# Patient Record
Sex: FEMALE | Race: WHITE
[De-identification: names, ages, dates, MRNs, and addresses within clinical notes are randomized per-mention and may not be internally consistent; named-entity substitution may affect disease eponyms.]

---

## 2019-01-29 PROBLEM — Z00.00 ENCOUNTER FOR PREVENTIVE HEALTH EXAMINATION: Status: ACTIVE | Noted: 2019-01-29

## 2020-12-08 PROBLEM — K43.2 RECURRENT INCISIONAL HERNIA: Status: RESOLVED | Noted: 2020-12-08 | Resolved: 2020-12-08

## 2020-12-09 ENCOUNTER — APPOINTMENT (OUTPATIENT)
Dept: SURGERY | Facility: CLINIC | Age: 55
End: 2020-12-09
Payer: COMMERCIAL

## 2020-12-09 VITALS
DIASTOLIC BLOOD PRESSURE: 80 MMHG | WEIGHT: 205 LBS | HEART RATE: 96 BPM | BODY MASS INDEX: 38.71 KG/M2 | OXYGEN SATURATION: 98 % | SYSTOLIC BLOOD PRESSURE: 137 MMHG | TEMPERATURE: 97.5 F | HEIGHT: 61 IN | RESPIRATION RATE: 15 BRPM

## 2020-12-09 DIAGNOSIS — K40.20 BILATERAL INGUINAL HERNIA, W/OUT OBSTRUCTION OR GANGRENE, NOT SPECIFIED AS RECURRENT: ICD-10-CM

## 2020-12-09 DIAGNOSIS — K43.2 INCISIONAL HERNIA W/OUT OBSTRUCTION OR GANGRENE: ICD-10-CM

## 2020-12-09 DIAGNOSIS — M32.9 SYSTEMIC LUPUS ERYTHEMATOSUS, UNSPECIFIED: ICD-10-CM

## 2020-12-09 PROCEDURE — 99244 OFF/OP CNSLTJ NEW/EST MOD 40: CPT

## 2020-12-09 PROCEDURE — 99072 ADDL SUPL MATRL&STAF TM PHE: CPT

## 2020-12-09 RX ORDER — IRON/IRON ASP GLY/FA/MV-MIN 38 125-25-1MG
TABLET ORAL
Refills: 0 | Status: ACTIVE | COMMUNITY

## 2020-12-09 RX ORDER — ALENDRONATE SODIUM 70 MG/1
TABLET ORAL
Refills: 0 | Status: ACTIVE | COMMUNITY

## 2020-12-09 RX ORDER — HYDROXYCHLOROQUINE SULFATE 200 MG/1
200 TABLET ORAL
Refills: 0 | Status: ACTIVE | COMMUNITY

## 2020-12-09 RX ORDER — PNV NO.95/FERROUS FUM/FOLIC AC 28MG-0.8MG
TABLET ORAL
Refills: 0 | Status: ACTIVE | COMMUNITY

## 2020-12-09 RX ORDER — CHROMIUM 200 MCG
TABLET ORAL
Refills: 0 | Status: ACTIVE | COMMUNITY

## 2020-12-09 NOTE — PLAN
[FreeTextEntry1] : CT images to be downloaded and reviewed and decision then to be made regarding possible need for hernia repair.

## 2020-12-09 NOTE — ASSESSMENT
[FreeTextEntry1] : 55-year-old obese female with resolved episode of abdominal pain, diarrhea and hematuria with CT scan reportedly showing fat containing inguinal hernias and a fat-containing upper midline defect. Patient's body habitus precludes any meaningful exam and CT images unable to be retrieved for review.

## 2020-12-09 NOTE — HISTORY OF PRESENT ILLNESS
[de-identified] : Linda is a 56 y/o female here for evaluation of pain at surgical site. Patient is s/p repair of incarcerated recurrent incisional hernia with Kugel patch on 12/1/12.  [de-identified] : Status post repair of incarcerated recurrent incisional hernia 8 years ago. Several weeks ago patient experienced acute midabdominal pain as well as pain across the suprapubic area associated with loose bowel movements and hematuria. The hematuria cleared fairly quickly and exam by the patient's PCP was apparently unremarkable. She was then sent for an abdominopelvic CT scan which reportedly demonstrated bilateral fat containing inguinal hernias as well as a fat-containing midline defect. At this point the patient's pain has largely resolved and bowel movements have returned to normal. No further episodes of hematuria noted.

## 2020-12-09 NOTE — PHYSICAL EXAM
[Normal Breath Sounds] : Normal breath sounds [Normal Heart Sounds] : normal heart sounds [Normal Rate and Rhythm] : normal rate and rhythm [No Rash or Lesion] : No rash or lesion [Alert] : alert [Oriented to Person] : oriented to person [Oriented to Place] : oriented to place [Oriented to Time] : oriented to time [Calm] : calm [JVD] : no jugular venous distention  [de-identified] : Well nourished female, in no apparent distress [de-identified] : WNL [de-identified] : Morbidly obese, soft, nondistended, nontender. Well-healed lower midline surgical scar and no hernias palpable in either groin or anywhere in the midline. [de-identified] : Full ROM

## 2024-12-13 ENCOUNTER — TRANSCRIPTION ENCOUNTER (OUTPATIENT)
Age: 59
End: 2024-12-13

## 2024-12-13 ENCOUNTER — OUTPATIENT (OUTPATIENT)
Dept: OUTPATIENT SERVICES | Facility: HOSPITAL | Age: 59
LOS: 1 days | End: 2024-12-13
Payer: COMMERCIAL

## 2024-12-13 VITALS
OXYGEN SATURATION: 96 % | RESPIRATION RATE: 18 BRPM | DIASTOLIC BLOOD PRESSURE: 82 MMHG | TEMPERATURE: 99 F | HEART RATE: 87 BPM | HEIGHT: 60 IN | WEIGHT: 182.1 LBS | SYSTOLIC BLOOD PRESSURE: 142 MMHG

## 2024-12-13 DIAGNOSIS — Z47.2 ENCOUNTER FOR REMOVAL OF INTERNAL FIXATION DEVICE: ICD-10-CM

## 2024-12-13 DIAGNOSIS — M20.12 HALLUX VALGUS (ACQUIRED), LEFT FOOT: ICD-10-CM

## 2024-12-13 DIAGNOSIS — M20.5X2 OTHER DEFORMITIES OF TOE(S) (ACQUIRED), LEFT FOOT: ICD-10-CM

## 2024-12-13 DIAGNOSIS — Z98.890 OTHER SPECIFIED POSTPROCEDURAL STATES: Chronic | ICD-10-CM

## 2024-12-13 DIAGNOSIS — Z98.51 TUBAL LIGATION STATUS: Chronic | ICD-10-CM

## 2024-12-13 DIAGNOSIS — Z01.818 ENCOUNTER FOR OTHER PREPROCEDURAL EXAMINATION: ICD-10-CM

## 2024-12-13 DIAGNOSIS — M24.575 CONTRACTURE, LEFT FOOT: ICD-10-CM

## 2024-12-13 LAB
ANION GAP SERPL CALC-SCNC: 12 MMOL/L — SIGNIFICANT CHANGE UP (ref 5–17)
BUN SERPL-MCNC: 17 MG/DL — SIGNIFICANT CHANGE UP (ref 7–23)
CALCIUM SERPL-MCNC: 9 MG/DL — SIGNIFICANT CHANGE UP (ref 8.4–10.5)
CHLORIDE SERPL-SCNC: 107 MMOL/L — SIGNIFICANT CHANGE UP (ref 96–108)
CO2 SERPL-SCNC: 23 MMOL/L — SIGNIFICANT CHANGE UP (ref 22–31)
CREAT SERPL-MCNC: 0.39 MG/DL — LOW (ref 0.5–1.3)
EGFR: 114 ML/MIN/1.73M2 — SIGNIFICANT CHANGE UP
GLUCOSE SERPL-MCNC: 88 MG/DL — SIGNIFICANT CHANGE UP (ref 70–99)
HCT VFR BLD CALC: 39.2 % — SIGNIFICANT CHANGE UP (ref 34.5–45)
HGB BLD-MCNC: 13.2 G/DL — SIGNIFICANT CHANGE UP (ref 11.5–15.5)
MCHC RBC-ENTMCNC: 30.8 PG — SIGNIFICANT CHANGE UP (ref 27–34)
MCHC RBC-ENTMCNC: 33.7 G/DL — SIGNIFICANT CHANGE UP (ref 32–36)
MCV RBC AUTO: 91.4 FL — SIGNIFICANT CHANGE UP (ref 80–100)
NRBC # BLD: 0 /100 WBCS — SIGNIFICANT CHANGE UP (ref 0–0)
PLATELET # BLD AUTO: 211 K/UL — SIGNIFICANT CHANGE UP (ref 150–400)
POTASSIUM SERPL-MCNC: 3.7 MMOL/L — SIGNIFICANT CHANGE UP (ref 3.5–5.3)
POTASSIUM SERPL-SCNC: 3.7 MMOL/L — SIGNIFICANT CHANGE UP (ref 3.5–5.3)
RBC # BLD: 4.29 M/UL — SIGNIFICANT CHANGE UP (ref 3.8–5.2)
RBC # FLD: 13.6 % — SIGNIFICANT CHANGE UP (ref 10.3–14.5)
SODIUM SERPL-SCNC: 142 MMOL/L — SIGNIFICANT CHANGE UP (ref 135–145)
WBC # BLD: 6.59 K/UL — SIGNIFICANT CHANGE UP (ref 3.8–10.5)
WBC # FLD AUTO: 6.59 K/UL — SIGNIFICANT CHANGE UP (ref 3.8–10.5)

## 2024-12-13 PROCEDURE — 93010 ELECTROCARDIOGRAM REPORT: CPT | Mod: NC

## 2024-12-13 NOTE — H&P PST ADULT - PROBLEM SELECTOR PLAN 1
Patient provided with pre-operative instructions and verbalized understanding.  Patient can take ________ but otherwise will be NPO on day of surgery. Patient will stop NSAIDs, aspirin, herbal supplements or vitamins 1 week prior to surgery.  Chlorhexidine wash provided with instructions, verbalized understanding.  Pending medical clearance as per surgeon. Patient provided with pre-operative instructions and verbalized understanding.  Patient will be NPO on day of surgery. Patient will stop NSAIDs, aspirin, herbal supplements or vitamins 1 week prior to surgery.  Chlorhexidine wash provided with instructions, verbalized understanding.      Pending medical clearance as per surgeon.  instructed no toe nail polish   last dose wegovy on 12/1, instructed to hold off until after surgery

## 2024-12-13 NOTE — H&P PST ADULT - NSICDXPASTMEDICALHX_GEN_ALL_CORE_FT
PAST MEDICAL HISTORY:  Hallux valgus of left foot     Hypertension     Prediabetes     SLE (Systemic Lupus Erythematosus)

## 2024-12-13 NOTE — H&P PST ADULT - HISTORY OF PRESENT ILLNESS
Patient is a 59 year old F with PMHx of __ or no pertinent medical history presents for perioperative testing for _____ with Dr. Mireille Carter scheduled for /2024. Reports left foot pain since September, seen by podiatrist, "the insert was moved from my old bunion surgery" therefore recommended for upcoming surgery. Denies any acute symptoms at this time. Patient otherwise feels well overall.      Patient is a 59 year old F presents for perioperative testing for removal of hardware left foot, vallecillo left foot an tenotomy with Dr. Mireille Carter scheduled for 12/20/2024. Reports left foot pain since September, seen by podiatrist, "the insert was moved from my old bunion surgery" therefore recommended for upcoming surgery. Denies any acute symptoms at this time. Patient otherwise feels well overall.

## 2024-12-13 NOTE — H&P PST ADULT - NSICDXFAMILYHX_GEN_ALL_CORE_FT
FAMILY HISTORY:  Sibling  Still living? Unknown  Family history of diabetes mellitus (DM), Age at diagnosis: Age Unknown    Aunt  Still living? Unknown  Family history of diabetes mellitus (DM), Age at diagnosis: Age Unknown

## 2024-12-13 NOTE — H&P PST ADULT - NSICDXPASTSURGICALHX_GEN_ALL_CORE_FT
PAST SURGICAL HISTORY:  History of Appendectomy     S/P Repair of Recurrent Ventral Hernia      PAST SURGICAL HISTORY:  H/O tubal ligation     History of Appendectomy     History of bunionectomy of left great toe     History of hemorrhoidectomy     S/P Repair of Recurrent Ventral Hernia     Status post left rotator cuff repair

## 2024-12-13 NOTE — H&P PST ADULT - COMMENTS
161/88 electronic Denies personal h/o DVT, PE  Denies bleeding or clotting disorders  Denies dentures/partials, loose teeth/caps

## 2024-12-13 NOTE — H&P PST ADULT - NSANTHOSAYNRD_GEN_A_CORE
neck 15 inches/No. KEVIN screening performed.  STOP BANG Legend: 0-2 = LOW Risk; 3-4 = INTERMEDIATE Risk; 5-8 = HIGH Risk

## 2024-12-14 LAB
A1C WITH ESTIMATED AVERAGE GLUCOSE RESULT: 5.6 % — SIGNIFICANT CHANGE UP (ref 4–5.6)
ESTIMATED AVERAGE GLUCOSE: 114 MG/DL — SIGNIFICANT CHANGE UP (ref 68–114)

## 2024-12-17 PROCEDURE — 80048 BASIC METABOLIC PNL TOTAL CA: CPT

## 2024-12-17 PROCEDURE — 93005 ELECTROCARDIOGRAM TRACING: CPT

## 2024-12-17 PROCEDURE — 83036 HEMOGLOBIN GLYCOSYLATED A1C: CPT

## 2024-12-17 PROCEDURE — 85027 COMPLETE CBC AUTOMATED: CPT

## 2024-12-17 PROCEDURE — G0463: CPT

## 2024-12-17 PROCEDURE — 36415 COLL VENOUS BLD VENIPUNCTURE: CPT

## 2024-12-19 RX ORDER — ORAL SEMAGLUTIDE 7 MG/1
2.4 TABLET ORAL
Refills: 0 | DISCHARGE

## 2024-12-19 NOTE — ASU PATIENT PROFILE, ADULT - NSICDXPASTSURGICALHX_GEN_ALL_CORE_FT
PAST SURGICAL HISTORY:  H/O tubal ligation     History of Appendectomy     History of bunionectomy of left great toe metal    History of hemorrhoidectomy     S/P Repair of Recurrent Ventral Hernia mesh    Status post left rotator cuff repair

## 2024-12-20 ENCOUNTER — OUTPATIENT (OUTPATIENT)
Dept: OUTPATIENT SERVICES | Facility: HOSPITAL | Age: 59
LOS: 1 days | End: 2024-12-20
Payer: COMMERCIAL

## 2024-12-20 ENCOUNTER — RESULT REVIEW (OUTPATIENT)
Age: 59
End: 2024-12-20

## 2024-12-20 ENCOUNTER — TRANSCRIPTION ENCOUNTER (OUTPATIENT)
Age: 59
End: 2024-12-20

## 2024-12-20 VITALS
RESPIRATION RATE: 15 BRPM | TEMPERATURE: 98 F | SYSTOLIC BLOOD PRESSURE: 143 MMHG | WEIGHT: 182.1 LBS | HEIGHT: 60 IN | OXYGEN SATURATION: 97 % | HEART RATE: 87 BPM | DIASTOLIC BLOOD PRESSURE: 89 MMHG

## 2024-12-20 VITALS
TEMPERATURE: 97 F | RESPIRATION RATE: 18 BRPM | OXYGEN SATURATION: 99 % | HEART RATE: 78 BPM | DIASTOLIC BLOOD PRESSURE: 74 MMHG | SYSTOLIC BLOOD PRESSURE: 132 MMHG

## 2024-12-20 DIAGNOSIS — M20.12 HALLUX VALGUS (ACQUIRED), LEFT FOOT: ICD-10-CM

## 2024-12-20 DIAGNOSIS — Z98.890 OTHER SPECIFIED POSTPROCEDURAL STATES: Chronic | ICD-10-CM

## 2024-12-20 DIAGNOSIS — M20.5X2 OTHER DEFORMITIES OF TOE(S) (ACQUIRED), LEFT FOOT: ICD-10-CM

## 2024-12-20 DIAGNOSIS — Z47.2 ENCOUNTER FOR REMOVAL OF INTERNAL FIXATION DEVICE: ICD-10-CM

## 2024-12-20 DIAGNOSIS — M24.575 CONTRACTURE, LEFT FOOT: ICD-10-CM

## 2024-12-20 DIAGNOSIS — Z98.51 TUBAL LIGATION STATUS: Chronic | ICD-10-CM

## 2024-12-20 LAB — GLUCOSE BLDC GLUCOMTR-MCNC: 93 MG/DL — SIGNIFICANT CHANGE UP (ref 70–99)

## 2024-12-20 PROCEDURE — 28285 REPAIR OF HAMMERTOE: CPT | Mod: T1

## 2024-12-20 PROCEDURE — 73620 X-RAY EXAM OF FOOT: CPT | Mod: 26,LT

## 2024-12-20 PROCEDURE — 20680 REMOVAL OF IMPLANT DEEP: CPT

## 2024-12-20 PROCEDURE — 88300 SURGICAL PATH GROSS: CPT | Mod: 26

## 2024-12-20 PROCEDURE — 73620 X-RAY EXAM OF FOOT: CPT

## 2024-12-20 PROCEDURE — 82962 GLUCOSE BLOOD TEST: CPT

## 2024-12-20 PROCEDURE — 28292 COR HLX VLGS RSC PRX PHLX BS: CPT | Mod: TA

## 2024-12-20 PROCEDURE — 88300 SURGICAL PATH GROSS: CPT

## 2024-12-20 RX ORDER — 0.9 % SODIUM CHLORIDE 0.9 %
1000 INTRAVENOUS SOLUTION INTRAVENOUS
Refills: 0 | Status: DISCONTINUED | OUTPATIENT
Start: 2024-12-20 | End: 2024-12-20

## 2024-12-20 RX ORDER — FENTANYL 12 UG/H
25 PATCH, EXTENDED RELEASE TRANSDERMAL ONCE
Refills: 0 | Status: DISCONTINUED | OUTPATIENT
Start: 2024-12-20 | End: 2024-12-20

## 2024-12-20 RX ORDER — ONDANSETRON HYDROCHLORIDE 4 MG/1
4 TABLET, FILM COATED ORAL ONCE
Refills: 0 | Status: DISCONTINUED | OUTPATIENT
Start: 2024-12-20 | End: 2024-12-20

## 2024-12-20 RX ORDER — HYDROXYCHLOROQUINE SULFATE 200 MG/1
1 TABLET, FILM COATED ORAL
Refills: 0 | DISCHARGE

## 2024-12-20 RX ADMIN — FENTANYL 25 MICROGRAM(S): 12 PATCH, EXTENDED RELEASE TRANSDERMAL at 09:09

## 2024-12-20 NOTE — BRIEF OPERATIVE NOTE - NSICDXBRIEFPREOP_GEN_ALL_CORE_FT
PRE-OP DIAGNOSIS:  Painful orthopaedic hardware 20-Dec-2024 09:05:55  Mireille Carter of left foot 20-Dec-2024 09:06:03  Mireille Carter, left foot 20-Dec-2024 09:06:20  Mireille Carter

## 2024-12-20 NOTE — BRIEF OPERATIVE NOTE - OPERATION/FINDINGS
S/p left foot removal of painful hardware, vallecillo osteotomy, capsulotomy and tenotomies of digits 2-5

## 2024-12-20 NOTE — BRIEF OPERATIVE NOTE - NSICDXBRIEFPROCEDURE_GEN_ALL_CORE_FT
PROCEDURES:  Removal of hardware from extremity 20-Dec-2024 09:04:23  Mireille Carter  Gill arthroplasty 20-Dec-2024 09:04:41  Mireille Carter  Hammertoe repair 20-Dec-2024 09:05:43  Mireille Carter

## 2024-12-20 NOTE — ASU DISCHARGE PLAN (ADULT/PEDIATRIC) - FINANCIAL ASSISTANCE
[facility] provides services at a reduced cost to those who are determined to be eligible through [facility]’s financial assistance program. Information regarding [facility]’s financial assistance program can be found by going to [email or web address] or by calling [phone].

## 2024-12-20 NOTE — ASU DISCHARGE PLAN (ADULT/PEDIATRIC) - CARE PROVIDER_API CALL
Mireille Carter  Podiatric Medicine and Surgery  [address] [place] [address]  Phone: [phone]  Fax: [phone]  Follow Up Time:

## 2024-12-20 NOTE — BRIEF OPERATIVE NOTE - NSICDXBRIEFPOSTOP_GEN_ALL_CORE_FT
POST-OP DIAGNOSIS:  Painful orthopaedic hardware 20-Dec-2024 09:06:42  Mireille Carter of left foot 20-Dec-2024 09:06:52  Mireille Carter, left foot 20-Dec-2024 09:07:01  Mireille Carter

## 2024-12-24 LAB — SURGICAL PATHOLOGY STUDY: SIGNIFICANT CHANGE UP

## (undated) DEVICE — SOL IRR POUR H2O 1500ML

## (undated) DEVICE — SYR CONTROL LUER LOK 10CC

## (undated) DEVICE — DRSG COBAN 4"

## (undated) DEVICE — DRAPE C ARM C-ARMOUR

## (undated) DEVICE — PACK LIJ BASIC ORTHO

## (undated) DEVICE — SUT MONOCRYL 4-0 27" PS-2 UNDYED

## (undated) DEVICE — DRAPE C ARM UNIVERSAL

## (undated) DEVICE — TOURNIQUET ESMARK 6"

## (undated) DEVICE — WARMING BLANKET FULL PEDS

## (undated) DEVICE — POSITIONER STRAP ARMBOARD VELCRO TS-30

## (undated) DEVICE — TOURNIQUET ESMARK 4"

## (undated) DEVICE — SUT VICRYL PLUS 2-0 27" FS-1 UNDYED

## (undated) DEVICE — ELCTR BOVIE TIP BLADE INSULATED 2.75" EDGE

## (undated) DEVICE — DRAPE U (BLUE) 60 X 60"

## (undated) DEVICE — DRSG STOCKINETTE IMPERVIOUS LG 9 X 48"

## (undated) DEVICE — ELCTR GROUNDING PAD PEDS COVIDIEN

## (undated) DEVICE — SUT ETHILON 3-0 18" FS-1

## (undated) DEVICE — ELCTR GROUNDING PAD ADULT COVIDIEN

## (undated) DEVICE — SUT ETHILON 4-0 18" PS-2

## (undated) DEVICE — WARMING BLANKET FULL UNDERBODY

## (undated) DEVICE — TOURNIQUET CUFF 18" DUAL PORT SINGLE BLADDER W PLC  (BLACK)

## (undated) DEVICE — DRSG STOCKINETTE IMPERVIOUS XL 12 X 48"

## (undated) DEVICE — DRSG STERISTRIPS 0.5 X 4"

## (undated) DEVICE — GLV 6.5 PROTEXIS (BLUE)

## (undated) DEVICE — SYR LUER LOK 10CC

## (undated) DEVICE — SOL IRR POUR NS 0.9% 1500ML

## (undated) DEVICE — NEPTUNE 4-PORT MANIFOLD STANDARD

## (undated) DEVICE — DRSG CURITY GAUZE SPONGE 4 X 4" 12-PLY

## (undated) DEVICE — ELCTR PENCIL SMOKE EVACUATOR COATED PUSH BUTTON 70MM

## (undated) DEVICE — GLV 6.5 PROTEXIS (WHITE)

## (undated) DEVICE — NDL HYPO REGULAR BEVEL 25G X 1.5" (BLUE)

## (undated) DEVICE — TOURNIQUET CUFF 24" DUAL PORT SINGLE BLADDER W PLC (BLACK)

## (undated) DEVICE — SUT VICRYL PLUS 0 27" OS-6 UNDYED

## (undated) DEVICE — LABELS BLANK W PEN

## (undated) DEVICE — PREP CHLORAPREP HI-LITE ORANGE 26ML

## (undated) DEVICE — TOURNIQUET CUFF 12" DUAL PORT LUER LOCK

## (undated) DEVICE — DRAPE IOBAN 33" X 23"

## (undated) DEVICE — DRSG DERMABOND 0.7ML